# Patient Record
Sex: FEMALE | Race: WHITE | Employment: OTHER | URBAN - NONMETROPOLITAN AREA
[De-identification: names, ages, dates, MRNs, and addresses within clinical notes are randomized per-mention and may not be internally consistent; named-entity substitution may affect disease eponyms.]

---

## 2022-04-27 ENCOUNTER — TELEPHONE (OUTPATIENT)
Dept: NEUROSURGERY | Age: 62
End: 2022-04-27

## 2022-04-27 NOTE — TELEPHONE ENCOUNTER
Patient Information     Patient Name MRN Alicia Xie [id number] Female 1953      Telephone Encounter by Stefanie Singh at 2017  2:34 PM     Author:  Stefanie Singh Service:  (none) Author Type:  (none)     Filed:  2017  2:41 PM Encounter Date:  2017 Status:  Signed     :  Stefanie Singh            Patient called stating that she is having a burning sensation just below her thumb in her cast.  She stated that they tried to get some ointment down on it and it had helped.  She is questioning if the cast is rubbing on her.  She states that it feels fine if she doesn't move, but if she moves her arm it starts to burn again.  I let her know that she would probably need to come in and have it checked.  Stefanie Singh LPN .......2017 2:41 PM          Spoke with patient to ask her about getting her appointment scheduled from a referral that was received from [facility] and patient hung up, or she lost signal. I tried calling patient to get her scheduled and had to leave a voicemail. Left call back number [phone] .  [facility] stated to add patient on schedule for 4/28/22 , around 1:00 pm.

## 2022-04-27 NOTE — TELEPHONE ENCOUNTER
[facility] Neurosurgery New Patient Questionnaire    1. Diagnosis/Reason for Referral?    Lesion of lumbar spine    2. Who is completing questionnaire? Patient x Caregiver Family      3. Has the patient had any previous spinal/brain surgeries? A. If yes, what is the name of the facility in which the surgery was performed? B. Procedure/Surgery performed? C. Who was the surgeon? D. When was the surgery? MM/YY       E. Did the patient improve after the surgery? 4. Is this a second opinion? If yes, Dr. Rod Jimenez would like to review patient first before making the appointment. 5. Have MRI Images been obtain within the last year? Yes x No      XR  CT     If yes, where was the imaging performed? [facility]   If yes, what part of the body? Lumbar X Cervical  Thoracic  Brain     If yes, when was it obtained? 4-26-22    Note: if the scan was performed at a facility other than [facility], the disc will need to be brought to the appointment or we need to reach out to obtain the disc. A. Was the patient instructed to provide the disc? Yes  X No      8. Has the patient had a NCV/EMG within the last year? Yes  No X     If yes, where was it performed and date? MM/YY  Location:      9. Has the patient been to Physical Therapy? Yes  No X     If yes, what location, how long attended, and last visit? Location:        Therapy Lasted:    Date of Last Visit:      10. Has the patient been to Pain Management? Yes  No X   If yes, what location and last visit     Location:   Last Visit:   Is it helping?

## 2022-04-28 ENCOUNTER — OFFICE VISIT (OUTPATIENT)
Dept: NEUROSURGERY | Age: 62
End: 2022-04-28
Payer: MEDICARE

## 2022-04-28 ENCOUNTER — TELEPHONE (OUTPATIENT)
Dept: NEUROSURGERY | Age: 62
End: 2022-04-28

## 2022-04-28 VITALS
WEIGHT: 189 LBS | HEIGHT: 64 IN | HEART RATE: 80 BPM | DIASTOLIC BLOOD PRESSURE: 68 MMHG | SYSTOLIC BLOOD PRESSURE: 114 MMHG | BODY MASS INDEX: 32.27 KG/M2

## 2022-04-28 DIAGNOSIS — Z01.818 PRE-OP EXAMINATION: ICD-10-CM

## 2022-04-28 DIAGNOSIS — R79.1 ABNORMAL COAGULATION PROFILE: ICD-10-CM

## 2022-04-28 DIAGNOSIS — M54.41 ACUTE MIDLINE LOW BACK PAIN WITH RIGHT-SIDED SCIATICA: ICD-10-CM

## 2022-04-28 DIAGNOSIS — M48.061 LUMBAR FORAMINAL STENOSIS: ICD-10-CM

## 2022-04-28 DIAGNOSIS — M51.36 DDD (DEGENERATIVE DISC DISEASE), LUMBAR: ICD-10-CM

## 2022-04-28 DIAGNOSIS — M51.16 LUMBAR DISC HERNIATION WITH RADICULOPATHY: Primary | ICD-10-CM

## 2022-04-28 PROCEDURE — 99205 OFFICE O/P NEW HI 60 MIN: CPT | Performed by: NURSE PRACTITIONER

## 2022-04-28 RX ORDER — FUROSEMIDE 20 MG/1
TABLET ORAL
COMMUNITY
End: 2022-05-03

## 2022-04-28 RX ORDER — RIMEGEPANT SULFATE 75 MG/75MG
TABLET, ORALLY DISINTEGRATING ORAL
COMMUNITY
End: 2022-05-03

## 2022-04-28 RX ORDER — SODIUM CHLORIDE 0.9 % (FLUSH) 0.9 %
5-40 SYRINGE (ML) INJECTION EVERY 12 HOURS SCHEDULED
Status: CANCELLED | OUTPATIENT
Start: 2022-04-28

## 2022-04-28 RX ORDER — METOPROLOL SUCCINATE 100 MG/1
100 TABLET, EXTENDED RELEASE ORAL DAILY
COMMUNITY

## 2022-04-28 RX ORDER — ROPINIROLE 1 MG/1
1 TABLET, FILM COATED ORAL NIGHTLY
COMMUNITY
End: 2022-05-03 | Stop reason: ALTCHOICE

## 2022-04-28 RX ORDER — POTASSIUM CHLORIDE 750 MG/1
TABLET, FILM COATED, EXTENDED RELEASE ORAL
COMMUNITY
End: 2022-05-03

## 2022-04-28 RX ORDER — LEVOTHYROXINE SODIUM 0.2 MG/1
200 TABLET ORAL DAILY
COMMUNITY
End: 2022-05-03 | Stop reason: DRUGHIGH

## 2022-04-28 RX ORDER — CLONAZEPAM 0.5 MG/1
TABLET ORAL
COMMUNITY
End: 2022-05-03

## 2022-04-28 RX ORDER — HYDROCODONE BITARTRATE AND ACETAMINOPHEN 10; 325 MG/1; MG/1
1 TABLET ORAL EVERY 6 HOURS PRN
COMMUNITY

## 2022-04-28 RX ORDER — ACETAMINOPHEN 325 MG/1
1000 TABLET ORAL ONCE
Status: CANCELLED | OUTPATIENT
Start: 2022-04-28 | End: 2022-04-28

## 2022-04-28 RX ORDER — VENLAFAXINE HYDROCHLORIDE 225 MG/1
225 TABLET, EXTENDED RELEASE ORAL
COMMUNITY
End: 2022-05-03

## 2022-04-28 RX ORDER — HYDROCHLOROTHIAZIDE 25 MG/1
25 TABLET ORAL DAILY
COMMUNITY

## 2022-04-28 RX ORDER — FAMOTIDINE 20 MG/1
TABLET, FILM COATED ORAL
COMMUNITY
End: 2022-05-03 | Stop reason: ALTCHOICE

## 2022-04-28 RX ORDER — PREDNISONE 20 MG/1
TABLET ORAL
COMMUNITY
End: 2022-05-03 | Stop reason: ALTCHOICE

## 2022-04-28 RX ORDER — PANTOPRAZOLE SODIUM 40 MG/1
40 TABLET, DELAYED RELEASE ORAL DAILY
COMMUNITY

## 2022-04-28 RX ORDER — FLUTICASONE PROPIONATE 50 MCG
1 SPRAY, SUSPENSION (ML) NASAL DAILY PRN
COMMUNITY

## 2022-04-28 RX ORDER — TOPIRAMATE 25 MG/1
25 TABLET ORAL 2 TIMES DAILY
COMMUNITY

## 2022-04-28 RX ORDER — SODIUM CHLORIDE 9 MG/ML
INJECTION, SOLUTION INTRAVENOUS PRN
Status: CANCELLED | OUTPATIENT
Start: 2022-04-28

## 2022-04-28 RX ORDER — LANOLIN ALCOHOL/MO/W.PET/CERES
400 CREAM (GRAM) TOPICAL DAILY
COMMUNITY

## 2022-04-28 RX ORDER — PENTOXIFYLLINE 400 MG/1
400 TABLET, EXTENDED RELEASE ORAL
COMMUNITY

## 2022-04-28 RX ORDER — SODIUM CHLORIDE 0.9 % (FLUSH) 0.9 %
5-40 SYRINGE (ML) INJECTION PRN
Status: CANCELLED | OUTPATIENT
Start: 2022-04-28

## 2022-04-28 ASSESSMENT — ENCOUNTER SYMPTOMS
RESPIRATORY NEGATIVE: 1
EYES NEGATIVE: 1
BACK PAIN: 1
GASTROINTESTINAL NEGATIVE: 1

## 2022-04-28 NOTE — H&P
[facility] Neurosurgery  H&P      Chief Complaint   Patient presents with    Consultation     back pain. HISTORY OF PRESENT ILLNESS:    Vince Gaytan is a 63 y.o. female with DM who presents with low back and RLE pain that has been ongoing for about 1 month. The pain does radiate into the RIGHT groin and anterior thigh. Her pain is mostly located in the right groin and leg. The patient complains of numbness of the right anterior thigh. She complains of LEFT leg pain that radiates down the leg that is chronic from a previous lumbar surgery about 23 years ago. Her pain is worsened when going from a seated to standing position. Her pain is worsened with walking. Her pain is not changed when lying flat. Overall, indicative that the patient does not have a mechanical nature to their pain. The patient has underwent a non-operative treatment course that has included:  NSAIDs  Muscle Relaxers (Soma)  Lyrica  Gabapentin  Opiates (Norco - per [facility])  IM Steroids - could not walk after the shot       Of note she does not use tobacco and does not take blood thinning medications.                Past Medical History:   Diagnosis Date    Diabetes ([address]     Difficulty walking     Hypertension     Low back pain        Past Surgical History:   Procedure Laterality Date    KNEE SURGERY Right 2017    replacement       Current Outpatient Medications   Medication Sig Dispense Refill    clonazePAM (KLONOPIN) 0.5 MG tablet clonazepam 0.5 mg tablet      famotidine (PEPCID) 20 MG tablet famotidine 20 mg tablet      fluticasone (FLONASE) 50 MCG/ACT nasal spray fluticasone propionate 50 mcg/actuation nasal spray,suspension      furosemide (LASIX) 20 MG tablet furosemide 20 mg tablet      hydroCHLOROthiazide (HYDRODIURIL) 25 MG tablet hydrochlorothiazide 25 mg tablet      HYDROcodone-acetaminophen (NORCO)  MG per tablet hydrocodone 10 mg-acetaminophen 325 mg tablet   Take 1 tablet every 4 hours by oral route as needed for 30 days.  levothyroxine (SYNTHROID) 200 MCG tablet levothyroxine 200 mcg tablet      magnesium oxide (MAG-OX) 400 (240 Mg) MG tablet magnesium oxide 400 mg (241.3 mg magnesium) tablet      metoprolol succinate (TOPROL XL) 100 MG extended release tablet metoprolol succinate  mg tablet,extended release 24 hr      pentoxifylline (TRENTAL) 400 MG extended release tablet pentoxifylline  mg tablet,extended release      potassium chloride (KLOR-CON) 10 MEQ extended release tablet potassium chloride ER 10 mEq tablet,extended release      predniSONE (DELTASONE) 20 MG tablet prednisone 20 mg tablet      Rimegepant Sulfate (NURTEC) 75 MG TBDP Nurtec ODT 75 mg disintegrating tablet   Take 1 tablet as needed by oral route as directed for 15 days.  rOPINIRole (REQUIP) 1 MG tablet ropinirole 1 mg tablet      topiramate (TOPAMAX) 25 MG tablet topiramate 25 mg tablet      venlafaxine 225 MG extended release tablet venlafaxine  mg tablet,extended release 24 hr   Take 1 tablet every day by oral route.  pantoprazole (PROTONIX) 40 MG tablet pantoprazole 40 mg tablet,delayed release       No current facility-administered medications for this visit. Allergies:  Atenolol, Flexeril [cyclobenzaprine], Oxycodone-acetaminophen, and Perindopril    Social History:   Social History     Tobacco Use   Smoking Status Never Smoker   Smokeless Tobacco Never Used     Social History     Substance and Sexual Activity   Alcohol Use None         Family History:   Family History   Problem Relation Age of Onset    Cerebral Aneurysm Mother     Heart Disease Mother     Diabetes Father     Hypertension Father        REVIEW OF SYSTEMS:  Constitutional: Negative. HENT: Negative. Eyes: Negative. Respiratory: Negative. Cardiovascular: Negative. Gastrointestinal: Negative. Genitourinary: Negative. Musculoskeletal: Positive for back pain. Skin: Negative. Neurological: Negative. Endo/Heme/Allergies: Negative. Psychiatric/Behavioral: Negative. PHYSICAL EXAM:  Vitals:    04/28/22 1251   BP: 114/68   Pulse: 80     Constitutional: appears well-developed and well-nourished. Eyes  conjunctiva normal.  Pupils react to light  Ear, nose, throat - hearing intact to finger rub, No scars, masses, or lesions over external nose or ears, no atrophy oftongue  Neck- symmetric, no masses noted, no jugular vein distension  Respiration- chest wall appears symmetric, good expansion, normal effort without use of accessory muscles  Musculoskeletal  no significant wasting of muscles noted, no bony deformities, gait no gross ataxia  Extremities- no clubbing, cyanosis oredema  Skin  warm, dry, and intact. No rash, erythema, or pallor. Psychiatric  mood, affect, and behavior appear normal.     Neurologic Examination  Awake, Alert and oriented x 4  Normal speech pattern, following commands    Motor:  RIGHT:     iliopsoas 5/5    knee flexor 5/5    knee extension 5/5    EHL/dorsiflexion 5/5    plantar flexion 5/5    LEFT:      iliopsoas 5/5    knee flexor 5/5    knee extension 5/5    EHL/dorsiflexion 5/5    plantar flexion 5/5    No deficits to light touch or pinprick sensation  Reflexes are 2+ and symmetric  No myofacial tenderness to palpation  Normal Gait pattern        DATA and IMAGING:    Nursing/pcp notes, imaging, labs, and vitals reviewed. PT,OT and/or speech notes reviewed    No results found for: WBC, HGB, HCT, MCV, PLTNo results found for: NA, K, CL, CO2, BUN, CREATININE, GLUCOSE, CALCIUM, PROT, LABALBU, BILITOT, ALKPHOS, AST, ALT, LABGLOM, GFRAA, AGRATIO, GLOBNo results found for: INR, PROTIME      MRI Lumbar Spine (4/22/2022) Jef Mensah  I have personally reviewed these images and my interpretation is:   There is DDD thorughout  Evidence of previous surgery on the left at L5-S1  L4-5 mild foraminal stenosis bilaterally   L2-3 there is a right intra foraminal mass which could represent a disc herniation , there does appear to be some mass effect on the L2 nerve root       ASSESSMENT:    [place] January is a 63 y.o. female with complaints of RIGHT groin and thigh pain. ICD-10-CM    1. Lumbar disc herniation with radiculopathy  M51.16 APTT     CBC     Comprehensive Metabolic Panel     EKG 12 Lead     Protime-INR     Type and Screen     Urinalysis with Reflex to Culture   2. Lumbar foraminal stenosis  M48.061    3. DDD (degenerative disc disease), lumbar  M51.36    4. Acute midline low back pain with right-sided sciatica  M54.41    5. Pre-op examination  Z01.818 XR CHEST (2 VW)   6. Abnormal coagulation profile   R79.1 APTT     Protime-INR       PLAN:  We have discussed and reviewed the results of the MRI lumbar spine with Ms. Corley at length. We explained that she does have what is likely an extruded disc fragment at L2-3 on the RIGHT that is likely the culprit of her right groin and thigh pain. We discussed operative versus non-operative treatments, Ms. Saqib Esquivel states she is in so much pain and has not left her house in over a month and she is not wanting to try more non-operative treatments. We explained that the surgery will be done to treat the right groin and thigh pain not the LLE pain or back pain. She will need a RIGHT L2-3 microdiscectomy using a far lateral approach minimally invasive technique (May 11th 1st Case of the day)    May obtain permanent specimen depending on characteristics of material    We discussed risks, complications, and expectations, including but not limited to infection, paralysis, bowel and bladder dysfunction, possible need for revision procedure, persistent pain, spinal fluid leak, stroke and death. In addition, the benefits of the surgery were thoroughly discussed and the patient demonstrated a deep understanding. The patient wishes to proceed with surgical intervention.   We will schedule for surgery in the near future.       Leticia Mancia, STEW

## 2022-04-28 NOTE — H&P (VIEW-ONLY)
[facility] Neurosurgery  H&P      Chief Complaint   Patient presents with    Consultation     back pain. HISTORY OF PRESENT ILLNESS:    Estela Light is a 63 y.o. female with DM who presents with low back and RLE pain that has been ongoing for about 1 month. The pain does radiate into the RIGHT groin and anterior thigh. Her pain is mostly located in the right groin and leg. The patient complains of numbness of the right anterior thigh. She complains of LEFT leg pain that radiates down the leg that is chronic from a previous lumbar surgery about 23 years ago. Her pain is worsened when going from a seated to standing position. Her pain is worsened with walking. Her pain is not changed when lying flat. Overall, indicative that the patient does not have a mechanical nature to their pain. The patient has underwent a non-operative treatment course that has included:  NSAIDs  Muscle Relaxers (Soma)  Lyrica  Gabapentin  Opiates (Norco - per [facility])  IM Steroids - could not walk after the shot       Of note she does not use tobacco and does not take blood thinning medications.                Past Medical History:   Diagnosis Date    Diabetes ([address]     Difficulty walking     Hypertension     Low back pain        Past Surgical History:   Procedure Laterality Date    KNEE SURGERY Right 2017    replacement       Current Outpatient Medications   Medication Sig Dispense Refill    clonazePAM (KLONOPIN) 0.5 MG tablet clonazepam 0.5 mg tablet      famotidine (PEPCID) 20 MG tablet famotidine 20 mg tablet      fluticasone (FLONASE) 50 MCG/ACT nasal spray fluticasone propionate 50 mcg/actuation nasal spray,suspension      furosemide (LASIX) 20 MG tablet furosemide 20 mg tablet      hydroCHLOROthiazide (HYDRODIURIL) 25 MG tablet hydrochlorothiazide 25 mg tablet      HYDROcodone-acetaminophen (NORCO)  MG per tablet hydrocodone 10 mg-acetaminophen 325 mg tablet   Take 1 tablet every 4 hours by oral route as needed for 30 days.  levothyroxine (SYNTHROID) 200 MCG tablet levothyroxine 200 mcg tablet      magnesium oxide (MAG-OX) 400 (240 Mg) MG tablet magnesium oxide 400 mg (241.3 mg magnesium) tablet      metoprolol succinate (TOPROL XL) 100 MG extended release tablet metoprolol succinate  mg tablet,extended release 24 hr      pentoxifylline (TRENTAL) 400 MG extended release tablet pentoxifylline  mg tablet,extended release      potassium chloride (KLOR-CON) 10 MEQ extended release tablet potassium chloride ER 10 mEq tablet,extended release      predniSONE (DELTASONE) 20 MG tablet prednisone 20 mg tablet      Rimegepant Sulfate (NURTEC) 75 MG TBDP Nurtec ODT 75 mg disintegrating tablet   Take 1 tablet as needed by oral route as directed for 15 days.  rOPINIRole (REQUIP) 1 MG tablet ropinirole 1 mg tablet      topiramate (TOPAMAX) 25 MG tablet topiramate 25 mg tablet      venlafaxine 225 MG extended release tablet venlafaxine  mg tablet,extended release 24 hr   Take 1 tablet every day by oral route.  pantoprazole (PROTONIX) 40 MG tablet pantoprazole 40 mg tablet,delayed release       No current facility-administered medications for this visit. Allergies:  Atenolol, Flexeril [cyclobenzaprine], Oxycodone-acetaminophen, and Perindopril    Social History:   Social History     Tobacco Use   Smoking Status Never Smoker   Smokeless Tobacco Never Used     Social History     Substance and Sexual Activity   Alcohol Use None         Family History:   Family History   Problem Relation Age of Onset    Cerebral Aneurysm Mother     Heart Disease Mother     Diabetes Father     Hypertension Father        REVIEW OF SYSTEMS:  Constitutional: Negative. HENT: Negative. Eyes: Negative. Respiratory: Negative. Cardiovascular: Negative. Gastrointestinal: Negative. Genitourinary: Negative. Musculoskeletal: Positive for back pain. Skin: Negative. Neurological: Negative. Endo/Heme/Allergies: Negative. Psychiatric/Behavioral: Negative. PHYSICAL EXAM:  Vitals:    04/28/22 1251   BP: 114/68   Pulse: 80     Constitutional: appears well-developed and well-nourished. Eyes  conjunctiva normal.  Pupils react to light  Ear, nose, throat - hearing intact to finger rub, No scars, masses, or lesions over external nose or ears, no atrophy oftongue  Neck- symmetric, no masses noted, no jugular vein distension  Respiration- chest wall appears symmetric, good expansion, normal effort without use of accessory muscles  Musculoskeletal  no significant wasting of muscles noted, no bony deformities, gait no gross ataxia  Extremities- no clubbing, cyanosis oredema  Skin  warm, dry, and intact. No rash, erythema, or pallor. Psychiatric  mood, affect, and behavior appear normal.     Neurologic Examination  Awake, Alert and oriented x 4  Normal speech pattern, following commands    Motor:  RIGHT:     iliopsoas 5/5    knee flexor 5/5    knee extension 5/5    EHL/dorsiflexion 5/5    plantar flexion 5/5    LEFT:      iliopsoas 5/5    knee flexor 5/5    knee extension 5/5    EHL/dorsiflexion 5/5    plantar flexion 5/5    No deficits to light touch or pinprick sensation  Reflexes are 2+ and symmetric  No myofacial tenderness to palpation  Normal Gait pattern        DATA and IMAGING:    Nursing/pcp notes, imaging, labs, and vitals reviewed. PT,OT and/or speech notes reviewed    No results found for: WBC, HGB, HCT, MCV, PLTNo results found for: NA, K, CL, CO2, BUN, CREATININE, GLUCOSE, CALCIUM, PROT, LABALBU, BILITOT, ALKPHOS, AST, ALT, LABGLOM, GFRAA, AGRATIO, GLOBNo results found for: INR, PROTIME      MRI Lumbar Spine (4/22/2022) Marichuy Dent  I have personally reviewed these images and my interpretation is:   There is DDD thorughout  Evidence of previous surgery on the left at L5-S1  L4-5 mild foraminal stenosis bilaterally   L2-3 there is a right intra foraminal mass which could represent a disc herniation , there does appear to be some mass effect on the L2 nerve root       ASSESSMENT:    [place] January is a 63 y.o. female with complaints of RIGHT groin and thigh pain. ICD-10-CM    1. Lumbar disc herniation with radiculopathy  M51.16 APTT     CBC     Comprehensive Metabolic Panel     EKG 12 Lead     Protime-INR     Type and Screen     Urinalysis with Reflex to Culture   2. Lumbar foraminal stenosis  M48.061    3. DDD (degenerative disc disease), lumbar  M51.36    4. Acute midline low back pain with right-sided sciatica  M54.41    5. Pre-op examination  Z01.818 XR CHEST (2 VW)   6. Abnormal coagulation profile   R79.1 APTT     Protime-INR       PLAN:  We have discussed and reviewed the results of the MRI lumbar spine with Ms. Corley at length. We explained that she does have what is likely an extruded disc fragment at L2-3 on the RIGHT that is likely the culprit of her right groin and thigh pain. We discussed operative versus non-operative treatments, Ms. Saqib Esquivel states she is in so much pain and has not left her house in over a month and she is not wanting to try more non-operative treatments. We explained that the surgery will be done to treat the right groin and thigh pain not the LLE pain or back pain. She will need a RIGHT L2-3 microdiscectomy using a far lateral approach minimally invasive technique (May 11th 1st Case of the day)    May obtain permanent specimen depending on characteristics of material    We discussed risks, complications, and expectations, including but not limited to infection, paralysis, bowel and bladder dysfunction, possible need for revision procedure, persistent pain, spinal fluid leak, stroke and death. In addition, the benefits of the surgery were thoroughly discussed and the patient demonstrated a deep understanding. The patient wishes to proceed with surgical intervention.   We will schedule for surgery in the near future.       Vadim Ruff APRN

## 2022-04-28 NOTE — TELEPHONE ENCOUNTER
Upon checking patient in Pt stated that she doesn't have [facility] Medicare. Medicare came back as her medicare is managed by [employer]. And does have a copay today. Pt stated she cancelled [facility] because of the copay. I did tell pt that  today they would bill her for the amount. Pt stated she would call [facility] and take care of it, because it was suppose to be cancelled.

## 2022-04-28 NOTE — PROGRESS NOTES
[facility]  Office Visit      Chief Complaint   Patient presents with   [facility] Consultation     back pain. HISTORY OF PRESENT ILLNESS:    Estela Light is a 63 y.o. female with DM who presents with low back and RLE pain that has been ongoing for about 1 month. The pain does radiate into the RIGHT groin and anterior thigh. Her pain is mostly located in the right groin and leg. The patient complains of numbness of the right anterior thigh. She complains of LEFT leg pain that radiates down the leg that is chronic from a previous lumbar surgery about 23 years ago. Her pain is worsened when going from a seated to standing position. Her pain is worsened with walking. Her pain is not changed when lying flat. Overall, indicative that the patient does not have a mechanical nature to their pain. The patient has underwent a non-operative treatment course that has included:  NSAIDs  Muscle Relaxers (Soma)  Lyrica  Gabapentin  Opiates (Norco - per [facility] [place])  IM Steroids - could not walk after the shot       Of note she does not use tobacco and does not take blood thinning medications.                Past Medical History:   Diagnosis Date    Diabetes ([address]     Difficulty walking     Hypertension     Low back pain        Past Surgical History:   Procedure Laterality Date    KNEE SURGERY Right 2017    replacement       Current Outpatient Medications   Medication Sig Dispense Refill    clonazePAM (KLONOPIN) 0.5 MG tablet clonazepam 0.5 mg tablet      famotidine (PEPCID) 20 MG tablet famotidine 20 mg tablet      fluticasone (FLONASE) 50 MCG/ACT nasal spray fluticasone propionate 50 mcg/actuation nasal spray,suspension      furosemide (LASIX) 20 MG tablet furosemide 20 mg tablet      hydroCHLOROthiazide (HYDRODIURIL) 25 MG tablet hydrochlorothiazide 25 mg tablet      HYDROcodone-acetaminophen (NORCO)  MG per tablet hydrocodone 10 mg-acetaminophen 325 mg tablet   Take 1 tablet every 4 hours by oral route as needed for 30 days.  levothyroxine (SYNTHROID) 200 MCG tablet levothyroxine 200 mcg tablet      magnesium oxide (MAG-OX) 400 (240 Mg) MG tablet magnesium oxide 400 mg (241.3 mg magnesium) tablet      metoprolol succinate (TOPROL XL) 100 MG extended release tablet metoprolol succinate  mg tablet,extended release 24 hr      pentoxifylline (TRENTAL) 400 MG extended release tablet pentoxifylline  mg tablet,extended release      potassium chloride (KLOR-CON) 10 MEQ extended release tablet potassium chloride ER 10 mEq tablet,extended release      predniSONE (DELTASONE) 20 MG tablet prednisone 20 mg tablet      Rimegepant Sulfate (NURTEC) 75 MG TBDP Nurtec ODT 75 mg disintegrating tablet   Take 1 tablet as needed by oral route as directed for 15 days.  rOPINIRole (REQUIP) 1 MG tablet ropinirole 1 mg tablet      topiramate (TOPAMAX) 25 MG tablet topiramate 25 mg tablet      venlafaxine 225 MG extended release tablet venlafaxine  mg tablet,extended release 24 hr   Take 1 tablet every day by oral route.  pantoprazole (PROTONIX) 40 MG tablet pantoprazole 40 mg tablet,delayed release       No current facility-administered medications for this visit. Allergies:  Atenolol, Flexeril [cyclobenzaprine], Oxycodone-acetaminophen, and Perindopril    Social History:   Social History     Tobacco Use   Smoking Status Never Smoker   Smokeless Tobacco Never Used     Social History     Substance and Sexual Activity   Alcohol Use None         Family History:   Family History   Problem Relation Age of Onset    Cerebral Aneurysm Mother     Heart Disease Mother     Diabetes Father     Hypertension Father        REVIEW OF SYSTEMS:  Constitutional: Negative. HENT: Negative. Eyes: Negative. Respiratory: Negative. Cardiovascular: Negative. Gastrointestinal: Negative. Genitourinary: Negative. Musculoskeletal: Positive for back pain.    Skin: Negative. Neurological: Negative. Endo/Heme/Allergies: Negative. Psychiatric/Behavioral: Negative. PHYSICAL EXAM:  Vitals:    04/28/22 1251   BP: 114/68   Pulse: 80     Constitutional: appears well-developed and well-nourished. Eyes  conjunctiva normal.  Pupils react to light  Ear, nose, throat - hearing intact to finger rub, No scars, masses, or lesions over external nose or ears, no atrophy oftongue  Neck- symmetric, no masses noted, no jugular vein distension  Respiration- chest wall appears symmetric, good expansion, normal effort without use of accessory muscles  Musculoskeletal  no significant wasting of muscles noted, no bony deformities, gait no gross ataxia  Extremities- no clubbing, cyanosis oredema  Skin  warm, dry, and intact. No rash, erythema, or pallor. Psychiatric  mood, affect, and behavior appear normal.     Neurologic Examination  Awake, Alert and oriented x 4  Normal speech pattern, following commands    Motor:  RIGHT:     iliopsoas 5/5    knee flexor 5/5    knee extension 5/5    EHL/dorsiflexion 5/5    plantar flexion 5/5    LEFT:      iliopsoas 5/5    knee flexor 5/5    knee extension 5/5    EHL/dorsiflexion 5/5    plantar flexion 5/5    No deficits to light touch or pinprick sensation  Reflexes are 2+ and symmetric  No myofacial tenderness to palpation  Normal Gait pattern        DATA and IMAGING:    Nursing/pcp notes, imaging, labs, and vitals reviewed. PT,OT and/or speech notes reviewed    No results found for: WBC, HGB, HCT, MCV, PLTNo results found for: NA, K, CL, CO2, BUN, CREATININE, GLUCOSE, CALCIUM, PROT, LABALBU, BILITOT, ALKPHOS, AST, ALT, LABGLOM, GFRAA, AGRATIO, GLOBNo results found for: INR, PROTIME      MRI Lumbar Spine (4/22/2022) Karin Lugo  I have personally reviewed these images and my interpretation is:   There is DDD thorughout  Evidence of previous surgery on the left at L5-S1  L4-5 mild foraminal stenosis bilaterally   L2-3 there is a right intra foraminal mass which could represent a disc herniation , there does appear to be some mass effect on the L2 nerve root       ASSESSMENT:    Chantelle Reyes is a 63 y.o. female with complaints of right groin and anterior thigh pain. ICD-10-CM    1. Lumbar disc herniation with radiculopathy  M51.16 APTT     CBC     Comprehensive Metabolic Panel     EKG 12 Lead     Protime-INR     Type and Screen     Urinalysis with Reflex to Culture   2. Lumbar foraminal stenosis  M48.061    3. DDD (degenerative disc disease), lumbar  M51.36    4. Acute midline low back pain with right-sided sciatica  M54.41    5. Pre-op examination  Z01.818 XR CHEST (2 VW)   6. Abnormal coagulation profile   R79.1 APTT     Protime-INR       PLAN:  We have discussed and reviewed the results of the MRI lumbar spine with Ms. Corley at length. We explained that she does have what is likely an extruded disc fragment at L2-3 on the RIGHT that is likely the culprit of her right groin and thigh pain. We discussed operative versus non-operative treatments, Ms. Emma South states she is in so much pain and has not left her house in over a month and she is not wanting to try more non-operative treatments. We explained that the surgery will be done to treat the right groin and thigh pain not the LLE pain or back pain. She will need a RIGHT L2-3 microdiscectomy using a far lateral approach minimally invasive technique (May 11th 1st Case of the day)    May obtain permanent specimen depending on characteristics of material    We discussed risks, complications, and expectations, including but not limited to infection, paralysis, bowel and bladder dysfunction, possible need for revision procedure, persistent pain, spinal fluid leak, stroke and death. In addition, the benefits of the surgery were thoroughly discussed and the patient demonstrated a deep understanding. The patient wishes to proceed with surgical intervention.   We will schedule for surgery in the near future.       Jay Russo, APRN

## 2022-04-29 ENCOUNTER — TELEPHONE (OUTPATIENT)
Dept: NEUROSURGERY | Age: 62
End: 2022-04-29

## 2022-04-29 NOTE — TELEPHONE ENCOUNTER
Patient has an upcoming surgery and I was able to fill out a form and fax it in with clinicals. Cpt codes:  45475  icd-1o code:  M48.061  M54.41  Faxed and awaiting decision.

## 2022-05-02 NOTE — TELEPHONE ENCOUNTER
Received notification from [facility] on pre-cert status. It is APPROVED. Authorization#: [id number]    Approval is good from 4/29/2022-5/29/2022.

## 2022-05-03 ENCOUNTER — HOSPITAL ENCOUNTER (OUTPATIENT)
Dept: PREADMISSION TESTING | Age: 62
Discharge: HOME | End: 2022-05-07
Payer: MEDICARE

## 2022-05-03 ENCOUNTER — HOSPITAL ENCOUNTER (OUTPATIENT)
Dept: GENERAL RADIOLOGY | Age: 62
Discharge: HOME | End: 2022-05-03
Payer: MEDICARE

## 2022-05-03 VITALS — HEIGHT: 64 IN | BODY MASS INDEX: 32.27 KG/M2 | WEIGHT: 189 LBS

## 2022-05-03 DIAGNOSIS — Z01.818 PRE-OP EXAMINATION: ICD-10-CM

## 2022-05-03 DIAGNOSIS — M51.16 LUMBAR DISC HERNIATION WITH RADICULOPATHY: ICD-10-CM

## 2022-05-03 DIAGNOSIS — R79.1 ABNORMAL COAGULATION PROFILE: ICD-10-CM

## 2022-05-03 LAB
ABO/RH: NORMAL
ALBUMIN SERPL-MCNC: 4.3 G/DL (ref 3.5–5.2)
ALP BLD-CCNC: 84 U/L (ref 35–104)
ALT SERPL-CCNC: 22 U/L (ref 5–33)
ANION GAP SERPL CALCULATED.3IONS-SCNC: 14 MMOL/L (ref 7–19)
ANTIBODY SCREEN: NORMAL
APTT: 27 SEC (ref 26–36.2)
AST SERPL-CCNC: 24 U/L (ref 5–32)
BACTERIA: ABNORMAL /HPF
BILIRUB SERPL-MCNC: 0.3 MG/DL (ref 0.2–1.2)
BILIRUBIN URINE: NEGATIVE
BLOOD, URINE: NEGATIVE
BUN BLDV-MCNC: 25 MG/DL (ref 6–20)
CALCIUM SERPL-MCNC: 9.6 MG/DL (ref 8.6–10)
CHLORIDE BLD-SCNC: 100 MMOL/L (ref 98–111)
CLARITY: CLEAR
CO2: 24 MMOL/L (ref 22–29)
COLOR: YELLOW
CREAT SERPL-MCNC: 1.3 MG/DL (ref 0.5–0.9)
CRYSTALS, UA: ABNORMAL /HPF
EKG P AXIS: 33 DEGREES
EKG P-R INTERVAL: 170 MS
EKG Q-T INTERVAL: 408 MS
EKG QRS DURATION: 92 MS
EKG QTC CALCULATION (BAZETT): 411 MS
EKG T AXIS: 38 DEGREES
EPITHELIAL CELLS, UA: 13 /HPF (ref 0–5)
GFR AFRICAN AMERICAN: 51
GFR NON-AFRICAN AMERICAN: 42
GLUCOSE BLD-MCNC: 197 MG/DL (ref 74–109)
GLUCOSE URINE: NEGATIVE MG/DL
HCT VFR BLD CALC: 43.7 % (ref 37–47)
HEMOGLOBIN: 13.9 G/DL (ref 12–16)
HYALINE CASTS: 8 /HPF (ref 0–8)
INR BLD: 1.04 (ref 0.88–1.18)
KETONES, URINE: NEGATIVE MG/DL
LEUKOCYTE ESTERASE, URINE: ABNORMAL
MCH RBC QN AUTO: 30.4 PG (ref 27–31)
MCHC RBC AUTO-ENTMCNC: 31.8 G/DL (ref 33–37)
MCV RBC AUTO: 95.6 FL (ref 81–99)
NITRITE, URINE: NEGATIVE
PDW BLD-RTO: 12 % (ref 11.5–14.5)
PH UA: 5 (ref 5–8)
PLATELET # BLD: 324 K/UL (ref 130–400)
PMV BLD AUTO: 10 FL (ref 9.4–12.3)
POTASSIUM SERPL-SCNC: 4.2 MMOL/L (ref 3.5–5)
PROTEIN UA: NEGATIVE MG/DL
PROTHROMBIN TIME: 13.5 SEC (ref 12–14.6)
RBC # BLD: 4.57 M/UL (ref 4.2–5.4)
RBC UA: 1 /HPF (ref 0–4)
SODIUM BLD-SCNC: 138 MMOL/L (ref 136–145)
SPECIFIC GRAVITY UA: 1.02 (ref 1–1.03)
TOTAL PROTEIN: 6.9 G/DL (ref 6.6–8.7)
UROBILINOGEN, URINE: 0.2 E.U./DL
WBC # BLD: 8.4 K/UL (ref 4.8–10.8)
WBC UA: 11 /HPF (ref 0–5)

## 2022-05-03 PROCEDURE — 71046 X-RAY EXAM CHEST 2 VIEWS: CPT

## 2022-05-03 PROCEDURE — 93005 ELECTROCARDIOGRAM TRACING: CPT

## 2022-05-03 PROCEDURE — 80053 COMPREHEN METABOLIC PANEL: CPT

## 2022-05-03 PROCEDURE — 85027 COMPLETE CBC AUTOMATED: CPT

## 2022-05-03 PROCEDURE — 85610 PROTHROMBIN TIME: CPT

## 2022-05-03 PROCEDURE — 93010 ELECTROCARDIOGRAM REPORT: CPT | Performed by: INTERNAL MEDICINE

## 2022-05-03 PROCEDURE — 86850 RBC ANTIBODY SCREEN: CPT

## 2022-05-03 PROCEDURE — 86901 BLOOD TYPING SEROLOGIC RH(D): CPT

## 2022-05-03 PROCEDURE — 86900 BLOOD TYPING SEROLOGIC ABO: CPT

## 2022-05-03 PROCEDURE — 85730 THROMBOPLASTIN TIME PARTIAL: CPT

## 2022-05-03 PROCEDURE — 87086 URINE CULTURE/COLONY COUNT: CPT

## 2022-05-03 PROCEDURE — 81001 URINALYSIS AUTO W/SCOPE: CPT

## 2022-05-03 RX ORDER — UMECLIDINIUM BROMIDE AND VILANTEROL TRIFENATATE 62.5; 25 UG/1; UG/1
1 POWDER RESPIRATORY (INHALATION) DAILY
COMMUNITY

## 2022-05-03 RX ORDER — ALLOPURINOL 100 MG/1
100 TABLET ORAL 2 TIMES DAILY
COMMUNITY
Start: 2014-04-10

## 2022-05-03 RX ORDER — CARISOPRODOL 350 MG/1
350 TABLET ORAL 4 TIMES DAILY PRN
COMMUNITY

## 2022-05-03 RX ORDER — OXYBUTYNIN CHLORIDE 5 MG/1
5 TABLET ORAL DAILY
COMMUNITY

## 2022-05-03 RX ORDER — MONTELUKAST SODIUM 10 MG/1
10 TABLET ORAL NIGHTLY
COMMUNITY

## 2022-05-03 RX ORDER — LEVOTHYROXINE SODIUM 300 UG/1
300 TABLET ORAL DAILY
COMMUNITY

## 2022-05-03 RX ORDER — ACETAMINOPHEN 160 MG
3000 TABLET,DISINTEGRATING ORAL DAILY
COMMUNITY

## 2022-05-04 ENCOUNTER — TELEPHONE (OUTPATIENT)
Dept: NEUROSURGERY | Age: 62
End: 2022-05-04

## 2022-05-04 DIAGNOSIS — R94.31 ABNORMAL EKG: Primary | ICD-10-CM

## 2022-05-04 RX ORDER — CIPROFLOXACIN 250 MG/1
250 TABLET, FILM COATED ORAL 2 TIMES DAILY
Qty: 14 TABLET | Refills: 0 | Status: SHIPPED | OUTPATIENT
Start: 2022-05-04 | End: 2022-05-11

## 2022-05-04 NOTE — TELEPHONE ENCOUNTER
I called and s/w the patient, advised per STEW Garland, that UA does reflect a mild UTI. I advised that [facility] has already sent in a 7 day course of Cipro 250mg BID x7 days to [facility] and that patient should go ahead and get started on that today. I stated if there are no  major symptoms/complications related to this that patient should complete the course of medication and proceed with surgery as planned. Patient voiced understanding, states that she isn't having any current uti sx's but will get the med picked up today and begin taking that as prescribed. She will call back if she needs anything further.

## 2022-05-04 NOTE — TELEPHONE ENCOUNTER
----- Message from STEW Nunez sent at 5/4/2022 11:16 AM CDT -----  Please let Ms. Sidra Blanco know her Urine shows a mild UTI. I will go ahead and treat with just a 7 days course of antibiotics. She is fine to proceed with surgery given there are no implants. Med sent to [address] [place] [address].

## 2022-05-04 NOTE — TELEPHONE ENCOUNTER
Pre-op EKG reviewed and noted to be abnormal with findings of: inferior infarct, age undetermined. I have placed a cardiology consult to see if there are any contraindications to proceeding with surgery. I am sure she will be okay to proceed given that the surgery is a short minimally invasive procedure, however, we need to err on the safe side. Please make sure this gets done in a very timely fashion.   Thank  You!!

## 2022-05-05 LAB — URINE CULTURE, ROUTINE: NORMAL

## 2022-05-06 NOTE — TELEPHONE ENCOUNTER
I called cardiology and was able to speak to Alysha Red. ( from the answering service)  He sent message to cardiology. Then I got a call from [address] [place] [address]. She states she can get patient in on Tuesday 10 am.  [address] and she will be seeing Alfred Coleman.   Patient notified of all this and she states she will keep her appt

## 2022-05-10 ENCOUNTER — OFFICE VISIT (OUTPATIENT)
Dept: CARDIOLOGY CLINIC | Age: 62
End: 2022-05-10
Payer: MEDICARE

## 2022-05-10 ENCOUNTER — TELEPHONE (OUTPATIENT)
Dept: NEUROSURGERY | Age: 62
End: 2022-05-10

## 2022-05-10 VITALS
HEART RATE: 64 BPM | WEIGHT: 192 LBS | BODY MASS INDEX: 32.78 KG/M2 | DIASTOLIC BLOOD PRESSURE: 78 MMHG | SYSTOLIC BLOOD PRESSURE: 118 MMHG | HEIGHT: 64 IN

## 2022-05-10 DIAGNOSIS — I10 PRIMARY HYPERTENSION: ICD-10-CM

## 2022-05-10 DIAGNOSIS — Z01.810 PREOPERATIVE CARDIOVASCULAR EXAMINATION: Primary | ICD-10-CM

## 2022-05-10 DIAGNOSIS — I49.8 OTHER CARDIAC ARRHYTHMIA: ICD-10-CM

## 2022-05-10 PROCEDURE — 99203 OFFICE O/P NEW LOW 30 MIN: CPT | Performed by: CLINICAL NURSE SPECIALIST

## 2022-05-10 PROCEDURE — 93000 ELECTROCARDIOGRAM COMPLETE: CPT | Performed by: CLINICAL NURSE SPECIALIST

## 2022-05-10 NOTE — PROGRESS NOTES
[facility], [address], [address] [address]  Phone: [phone]  Fax: [phone]    OFFICE VISIT:  5/10/2022    Edwige Dillon - : 1960    Dear Dr. Anuel Coleman,     I appreciate the opportunity of participating in the care of Edwige Dillon. She is a very pleasant 63 y.o. female who I had the opportunity of seeing in my office today, 5/10/22. Records from your office have been obtained and reviewed. Reason For Visit:  Naveed Sabillon is a 63 y.o. female who is here for New Patient (no cardiac symptoms) and Abnormal Test Results (EKG)    Patient was referred to neurosurgery Dr. Anuel Coleman for lumbar disc herniation with radiculopathy. Surgery tomorrow. Preoperative work-up showed abnormal EKG    Ports that she has not had any cardiac symptoms. Activity is limited due to pain in her right leg coming from her back. She had stress test with dr Atilio Eaton in [facility] recently that was normal  She has had COVID and pneumonia twice, last time was a few mos ago. Ongoing SADLER and they were told she has residual effects from COVID. She is using an inhaler now. No wheezing      Subjective  Naveed Sabillon has no exertional chest pain, pressure, burning or squeezing. Denies shortness of breath rest.  Stable SADLER She is able to lie flat without evidence of orthopnea or paroxysmal nocturnal dyspnea. No symptomatic tachy- or samir-arrhythmia. No numbness or weakness to suggest cerebrovascular accident or transient ischemic attack. Reports no  edema. Edwige Dillon has the following history as recorded in [employer]: There are no problems to display for this patient.     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 ([address]     \"in remission right now\"    Cerebral artery occlusion with cerebral infarction ([address]     COVID-19 2022 was second round; has had covid x2; had pneumonia both times    Diabetes ([address]     Difficulty walking  Hypertension     Kidney disease     \"stage 3\"    Low back pain     Pneumonia     hx of with covid x 2     Past Surgical History:   Procedure Laterality Date    APPENDECTOMY      HYSTERECTOMY      KNEE SURGERY Right 2017    replacement     Family History   Problem Relation Age of Onset    Cerebral Aneurysm Mother     Heart Disease Mother     Diabetes Father     Hypertension Father      Social History     Tobacco Use    Smoking status: Never Smoker    Smokeless tobacco: Never Used   Substance Use Topics    Alcohol use: Not Currently        Allergies: Adhesive tape, Flexeril [cyclobenzaprine], Atenolol, Oxycodone-acetaminophen, and Perindopril    Current Outpatient Medications   Medication Sig Dispense Refill    ciprofloxacin (CIPRO) 250 MG tablet Take 1 tablet by mouth 2 times daily for 7 days 14 tablet 0    levothyroxine (SYNTHROID) 300 MCG tablet Take 300 mcg by mouth Daily Indications: Underactive Thyroid      carisoprodol (SOMA) 350 MG tablet Take 350 mg by mouth 4 times daily as needed for Muscle spasms.  montelukast (SINGULAIR) 10 MG tablet Take 10 mg by mouth nightly      oxybutynin (DITROPAN) 5 MG tablet Take 5 mg by mouth daily      SITagliptin (JANUVIA) 100 MG tablet Take 100 mg by mouth daily      allopurinol (ZYLOPRIM) 100 MG tablet Take 100 mg by mouth 2 times daily      umeclidinium-vilanterol (ANORO ELLIPTA) 62.5-25 MCG/INH AEPB inhaler Inhale 1 puff into the lungs daily      Cholecalciferol (VITAMIN D3) 50 MCG (2000 UT) CAPS Take 3,000 Units by mouth daily      fluticasone (FLONASE) 50 MCG/ACT nasal spray 1 spray by Nasal route daily as needed       hydroCHLOROthiazide (HYDRODIURIL) 25 MG tablet Take 25 mg by mouth daily       HYDROcodone-acetaminophen (NORCO)  MG per tablet Take 1 tablet by mouth every 6 hours as needed.        magnesium oxide (MAG-OX) 400 (240 Mg) MG tablet Take 400 mg by mouth daily       metoprolol succinate (TOPROL XL) 100 MG extended release tablet Take 100 mg by mouth daily       pentoxifylline (TRENTAL) 400 MG extended release tablet Take 400 mg by mouth 3 times daily (with meals)       topiramate (TOPAMAX) 25 MG tablet Take 25 mg by mouth 2 times daily       pantoprazole (PROTONIX) 40 MG tablet Take 40 mg by mouth daily        No current facility-administered medications for this visit. Review of Systems  Constitutional - no significant activity change, appetite change, or unexpected weight change. No fever, chills or diaphoresis. No fatigue. HEENT - no significant rhinorrhea or epistaxis. No tinnitus or significant hearing loss. Eyes - no sudden vision change or amaurosis. Respiratory - no significant wheezing, stridor, apnea or cough. No dyspnea on exertion or shortness of breath. Cardiovascular - no exertional chest pain, orthopnea or PND. No sensation of arrhythmia or slow heart rate. No claudication or leg edema. Gastrointestinal - no abdominal swelling or pain. No blood in stool. No severe constipation, diarrhea, nausea, or vomiting. Genitourinary - no difficulty urinating, dysuria, frequency, or urgency. No flank pain or hematuria. No  previous radiation or chemotherapy  Musculoskeletal - +  Back and right leg pain, +gait disturbance, or myalgia. Skin - no color change or rash. No pallor. No new surgical incision. Neurologic - no speech difficulty, facial asymmetry or lateralizing weakness. No seizures, presyncope, syncope, or significant dizziness. Hematologic - no easy bruising or excessive bleeding. Psychiatric - no severe anxiety or insomnia. No confusion. All other review of systems are negative. Objective  Vital Signs - /78   Pulse 64   Ht 5' 4\" (1.626 m)   Wt 192 lb (87.1 kg)   BMI 32.96 kg/m²   General - Mojgan Laboy is alert, cooperative, and pleasant. Well groomed. No acute distress. Body habitus is overweight. HEENT - The head is normocephalic. No circumoral cyanosis. Dentition is normal.   Ears and nose externally normal. No abnormal scars or lesions noted  EYES -  No Xanthelasma, no arcus senilis, no conjunctival hemorrhages or discharge. Neck - Supple, without increased jugular venous pressures. No carotid bruits. No mass. Respiratory - Lungs are clear bilaterally. No wheezes or rales. Normal effort without use of accessory muscles. No tactile fremitus on palpation  Cardiovascular - Heart has regular rhythm and rate. No murmurs, rubs or gallops. + pedal pulses and no varicosities. Abdominal -  Soft, nontender, nondistended. Bowel sounds are intact. Extremities - No clubbing, cyanosis, or  edema. Musculoskeletal -+ back pain with limp in gait no clubbing . No Osler's nodes. .  No kyphosis or scoliosis. Skin -  no statis ulcers or dermatitis. Neurological - No focal signs are identified. Oriented to person, place and time. Psychiatric -  Appropriate affect and mood. Assessment:          Diagnosis Orders   1. Preoperative cardiovascular examination     2. Other cardiac arrhythmia  EKG 12 lead   3. Primary hypertension         Preoperative EKG shows old inferior infarct. However doubtful actually Q waves    Called and received records from Dr. Coty Perera, cardiologist in [place]. Patient underwent a dobutamine nuclear stress test in March. Showed no ischemia or scar with preserved LV function EF 60%. 2D echo 3/14/2022 showed mildly dilated LV with intact LV contractility, diminished LV compliance, left atrium dilated, mild AI, mild TR and mild MR. Okay to proceed with lumbar surgery.   We will send letter of restratification to Dr. Jessa Rachel primary prevention measures including good blood pressure control, diabetes cholesterol control diet and exercise    Risk factors include diabetes, hypertension and cerebral artery occlusion        Plan    Will send letter to [address] [place], [place] [address]cam[place] Vaz[place]  Follow up as needed  Keep blood pressure,cholesterol and glucose well controlled. Call with any questions or concerns  Follow up with PCP for non cardiac problems  Report any new problems  Cardiovascular Fitness-Exercise as tolerated. Strive for 30 minutes of exercise most days of the week. Cardiac / Healthy Diet  Continue current medications as directed  Continue plan of treatment        I appreciate the opportunity of participating in the care and treatment of this patient. STEW Dupont dragon/transcription disclaimer: Much of this encounter note is electronic transcription/translation of spoken language to printed tach. Electronic translation of spoken language may be erroneous, or at times, nonsensical words or phrases may be inadvertently transcribed.  Although, I have reviewed the note for such errors, some may still exist.      Cc:  STEW Tuttle - CNP

## 2022-05-10 NOTE — PATIENT INSTRUCTIONS
Will send letter to DR De Los Santos Spearing  Follow up as needed  Keep blood pressure,cholesterol and glucose well controlled. Call with any questions or concerns  Follow up with PCP for non cardiac problems  Report any new problems  Cardiovascular Fitness-Exercise as tolerated. Strive for 30 minutes of exercise most days of the week.     Cardiac / Healthy Diet  Continue current medications as directed  Continue plan of treatment

## 2022-05-11 ENCOUNTER — ANESTHESIA (OUTPATIENT)
Dept: OPERATING ROOM | Age: 62
End: 2022-05-11
Payer: MEDICARE

## 2022-05-11 ENCOUNTER — APPOINTMENT (OUTPATIENT)
Dept: GENERAL RADIOLOGY | Age: 62
End: 2022-05-11
Attending: NEUROLOGICAL SURGERY
Payer: MEDICARE

## 2022-05-11 ENCOUNTER — ANESTHESIA EVENT (OUTPATIENT)
Dept: OPERATING ROOM | Age: 62
End: 2022-05-11
Payer: MEDICARE

## 2022-05-11 ENCOUNTER — HOSPITAL ENCOUNTER (OUTPATIENT)
Age: 62
Setting detail: OUTPATIENT SURGERY
Discharge: HOME | End: 2022-05-11
Attending: NEUROLOGICAL SURGERY | Admitting: NEUROLOGICAL SURGERY
Payer: MEDICARE

## 2022-05-11 VITALS
WEIGHT: 192 LBS | OXYGEN SATURATION: 97 % | DIASTOLIC BLOOD PRESSURE: 56 MMHG | TEMPERATURE: 96.7 F | RESPIRATION RATE: 16 BRPM | HEIGHT: 64 IN | BODY MASS INDEX: 32.78 KG/M2 | HEART RATE: 62 BPM | SYSTOLIC BLOOD PRESSURE: 113 MMHG

## 2022-05-11 VITALS — SYSTOLIC BLOOD PRESSURE: 88 MMHG | DIASTOLIC BLOOD PRESSURE: 52 MMHG | TEMPERATURE: 95.4 F | OXYGEN SATURATION: 96 %

## 2022-05-11 PROCEDURE — 2720000010 HC SURG SUPPLY STERILE: Performed by: NEUROLOGICAL SURGERY

## 2022-05-11 PROCEDURE — 2709999900 HC NON-CHARGEABLE SUPPLY: Performed by: NEUROLOGICAL SURGERY

## 2022-05-11 PROCEDURE — 6360000002 HC RX W HCPCS: Performed by: ANESTHESIOLOGY

## 2022-05-11 PROCEDURE — 7100000001 HC PACU RECOVERY - ADDTL 15 MIN: Performed by: NEUROLOGICAL SURGERY

## 2022-05-11 PROCEDURE — 6370000000 HC RX 637 (ALT 250 FOR IP): Performed by: NURSE PRACTITIONER

## 2022-05-11 PROCEDURE — 2580000003 HC RX 258: Performed by: ANESTHESIOLOGY

## 2022-05-11 PROCEDURE — 3600000005 HC SURGERY LEVEL 5 BASE: Performed by: NEUROLOGICAL SURGERY

## 2022-05-11 PROCEDURE — 7100000000 HC PACU RECOVERY - FIRST 15 MIN: Performed by: NEUROLOGICAL SURGERY

## 2022-05-11 PROCEDURE — 63056 DECOMPRESS SPINAL CORD LMBR: CPT | Performed by: NEUROLOGICAL SURGERY

## 2022-05-11 PROCEDURE — 3600000015 HC SURGERY LEVEL 5 ADDTL 15MIN: Performed by: NEUROLOGICAL SURGERY

## 2022-05-11 PROCEDURE — 2500000003 HC RX 250 WO HCPCS: Performed by: NURSE ANESTHETIST, CERTIFIED REGISTERED

## 2022-05-11 PROCEDURE — 6360000002 HC RX W HCPCS: Performed by: NURSE ANESTHETIST, CERTIFIED REGISTERED

## 2022-05-11 PROCEDURE — 3700000001 HC ADD 15 MINUTES (ANESTHESIA): Performed by: NEUROLOGICAL SURGERY

## 2022-05-11 PROCEDURE — C1769 GUIDE WIRE: HCPCS | Performed by: NEUROLOGICAL SURGERY

## 2022-05-11 PROCEDURE — 7100000011 HC PHASE II RECOVERY - ADDTL 15 MIN: Performed by: NEUROLOGICAL SURGERY

## 2022-05-11 PROCEDURE — 3700000000 HC ANESTHESIA ATTENDED CARE: Performed by: NEUROLOGICAL SURGERY

## 2022-05-11 PROCEDURE — 6370000000 HC RX 637 (ALT 250 FOR IP): Performed by: ANESTHESIOLOGY

## 2022-05-11 PROCEDURE — 6360000002 HC RX W HCPCS: Performed by: NURSE PRACTITIONER

## 2022-05-11 PROCEDURE — 7100000010 HC PHASE II RECOVERY - FIRST 15 MIN: Performed by: NEUROLOGICAL SURGERY

## 2022-05-11 RX ORDER — DEXAMETHASONE SODIUM PHOSPHATE 10 MG/ML
8 INJECTION, SOLUTION INTRAMUSCULAR; INTRAVENOUS ONCE
Status: DISCONTINUED | OUTPATIENT
Start: 2022-05-11 | End: 2022-05-11 | Stop reason: HOSPADM

## 2022-05-11 RX ORDER — MEPERIDINE HYDROCHLORIDE 25 MG/ML
12.5 INJECTION INTRAMUSCULAR; INTRAVENOUS; SUBCUTANEOUS EVERY 5 MIN PRN
Status: DISCONTINUED | OUTPATIENT
Start: 2022-05-11 | End: 2022-05-11 | Stop reason: HOSPADM

## 2022-05-11 RX ORDER — SODIUM CHLORIDE 0.9 % (FLUSH) 0.9 %
5-40 SYRINGE (ML) INJECTION PRN
Status: DISCONTINUED | OUTPATIENT
Start: 2022-05-11 | End: 2022-05-11 | Stop reason: HOSPADM

## 2022-05-11 RX ORDER — FENTANYL CITRATE 50 UG/ML
INJECTION, SOLUTION INTRAMUSCULAR; INTRAVENOUS PRN
Status: DISCONTINUED | OUTPATIENT
Start: 2022-05-11 | End: 2022-05-11 | Stop reason: SDUPTHER

## 2022-05-11 RX ORDER — MORPHINE SULFATE 2 MG/ML
2 INJECTION, SOLUTION INTRAMUSCULAR; INTRAVENOUS EVERY 5 MIN PRN
Status: DISCONTINUED | OUTPATIENT
Start: 2022-05-11 | End: 2022-05-11 | Stop reason: HOSPADM

## 2022-05-11 RX ORDER — MORPHINE SULFATE 4 MG/ML
4 INJECTION, SOLUTION INTRAMUSCULAR; INTRAVENOUS EVERY 5 MIN PRN
Status: DISCONTINUED | OUTPATIENT
Start: 2022-05-11 | End: 2022-05-11 | Stop reason: HOSPADM

## 2022-05-11 RX ORDER — DEXAMETHASONE SODIUM PHOSPHATE 10 MG/ML
INJECTION, SOLUTION INTRAMUSCULAR; INTRAVENOUS PRN
Status: DISCONTINUED | OUTPATIENT
Start: 2022-05-11 | End: 2022-05-11 | Stop reason: SDUPTHER

## 2022-05-11 RX ORDER — ONDANSETRON 2 MG/ML
INJECTION INTRAMUSCULAR; INTRAVENOUS PRN
Status: DISCONTINUED | OUTPATIENT
Start: 2022-05-11 | End: 2022-05-11 | Stop reason: SDUPTHER

## 2022-05-11 RX ORDER — MIDAZOLAM HYDROCHLORIDE 1 MG/ML
INJECTION INTRAMUSCULAR; INTRAVENOUS PRN
Status: DISCONTINUED | OUTPATIENT
Start: 2022-05-11 | End: 2022-05-11 | Stop reason: SDUPTHER

## 2022-05-11 RX ORDER — PROPOFOL 10 MG/ML
INJECTION, EMULSION INTRAVENOUS PRN
Status: DISCONTINUED | OUTPATIENT
Start: 2022-05-11 | End: 2022-05-11 | Stop reason: SDUPTHER

## 2022-05-11 RX ORDER — METOCLOPRAMIDE HYDROCHLORIDE 5 MG/ML
10 INJECTION INTRAMUSCULAR; INTRAVENOUS
Status: DISCONTINUED | OUTPATIENT
Start: 2022-05-11 | End: 2022-05-11 | Stop reason: HOSPADM

## 2022-05-11 RX ORDER — DIPHENHYDRAMINE HYDROCHLORIDE 50 MG/ML
12.5 INJECTION INTRAMUSCULAR; INTRAVENOUS
Status: DISCONTINUED | OUTPATIENT
Start: 2022-05-11 | End: 2022-05-11 | Stop reason: HOSPADM

## 2022-05-11 RX ORDER — ACETAMINOPHEN 500 MG
1000 TABLET ORAL ONCE
Status: COMPLETED | OUTPATIENT
Start: 2022-05-11 | End: 2022-05-11

## 2022-05-11 RX ORDER — LIDOCAINE HYDROCHLORIDE 10 MG/ML
1 INJECTION, SOLUTION EPIDURAL; INFILTRATION; INTRACAUDAL; PERINEURAL
Status: DISCONTINUED | OUTPATIENT
Start: 2022-05-11 | End: 2022-05-11 | Stop reason: HOSPADM

## 2022-05-11 RX ORDER — MIDAZOLAM HYDROCHLORIDE 1 MG/ML
2 INJECTION INTRAMUSCULAR; INTRAVENOUS
Status: DISCONTINUED | OUTPATIENT
Start: 2022-05-11 | End: 2022-05-11 | Stop reason: HOSPADM

## 2022-05-11 RX ORDER — LIDOCAINE HYDROCHLORIDE 10 MG/ML
INJECTION, SOLUTION INFILTRATION; PERINEURAL PRN
Status: DISCONTINUED | OUTPATIENT
Start: 2022-05-11 | End: 2022-05-11 | Stop reason: SDUPTHER

## 2022-05-11 RX ORDER — SODIUM CHLORIDE 0.9 % (FLUSH) 0.9 %
5-40 SYRINGE (ML) INJECTION EVERY 12 HOURS SCHEDULED
Status: DISCONTINUED | OUTPATIENT
Start: 2022-05-11 | End: 2022-05-11 | Stop reason: HOSPADM

## 2022-05-11 RX ORDER — FAMOTIDINE 20 MG/1
20 TABLET, FILM COATED ORAL ONCE
Status: COMPLETED | OUTPATIENT
Start: 2022-05-11 | End: 2022-05-11

## 2022-05-11 RX ORDER — SCOLOPAMINE TRANSDERMAL SYSTEM 1 MG/1
1 PATCH, EXTENDED RELEASE TRANSDERMAL
Status: DISCONTINUED | OUTPATIENT
Start: 2022-05-11 | End: 2022-05-11 | Stop reason: HOSPADM

## 2022-05-11 RX ORDER — ROCURONIUM BROMIDE 10 MG/ML
INJECTION, SOLUTION INTRAVENOUS PRN
Status: DISCONTINUED | OUTPATIENT
Start: 2022-05-11 | End: 2022-05-11 | Stop reason: SDUPTHER

## 2022-05-11 RX ORDER — SODIUM CHLORIDE, SODIUM LACTATE, POTASSIUM CHLORIDE, CALCIUM CHLORIDE 600; 310; 30; 20 MG/100ML; MG/100ML; MG/100ML; MG/100ML
INJECTION, SOLUTION INTRAVENOUS CONTINUOUS
Status: DISCONTINUED | OUTPATIENT
Start: 2022-05-11 | End: 2022-05-11 | Stop reason: HOSPADM

## 2022-05-11 RX ORDER — DEXMEDETOMIDINE HYDROCHLORIDE 100 UG/ML
INJECTION, SOLUTION INTRAVENOUS PRN
Status: DISCONTINUED | OUTPATIENT
Start: 2022-05-11 | End: 2022-05-11 | Stop reason: SDUPTHER

## 2022-05-11 RX ORDER — SODIUM CHLORIDE 9 MG/ML
INJECTION, SOLUTION INTRAVENOUS PRN
Status: DISCONTINUED | OUTPATIENT
Start: 2022-05-11 | End: 2022-05-11 | Stop reason: HOSPADM

## 2022-05-11 RX ADMIN — FENTANYL CITRATE 50 MCG: 50 INJECTION, SOLUTION INTRAMUSCULAR; INTRAVENOUS at 07:31

## 2022-05-11 RX ADMIN — Medication 2000 MG: at 07:41

## 2022-05-11 RX ADMIN — MORPHINE SULFATE 4 MG: 4 INJECTION, SOLUTION INTRAMUSCULAR; INTRAVENOUS at 09:38

## 2022-05-11 RX ADMIN — ACETAMINOPHEN 1000 MG: 500 TABLET ORAL at 07:01

## 2022-05-11 RX ADMIN — DEXAMETHASONE SODIUM PHOSPHATE 10 MG: 10 INJECTION, SOLUTION INTRAMUSCULAR; INTRAVENOUS at 07:39

## 2022-05-11 RX ADMIN — ONDANSETRON 4 MG: 2 INJECTION INTRAMUSCULAR; INTRAVENOUS at 07:39

## 2022-05-11 RX ADMIN — PROPOFOL 150 MG: 10 INJECTION, EMULSION INTRAVENOUS at 07:31

## 2022-05-11 RX ADMIN — MIDAZOLAM 2 MG: 1 INJECTION INTRAMUSCULAR; INTRAVENOUS at 07:26

## 2022-05-11 RX ADMIN — MORPHINE SULFATE 4 MG: 4 INJECTION, SOLUTION INTRAMUSCULAR; INTRAVENOUS at 09:58

## 2022-05-11 RX ADMIN — FENTANYL CITRATE 50 MCG: 50 INJECTION, SOLUTION INTRAMUSCULAR; INTRAVENOUS at 08:22

## 2022-05-11 RX ADMIN — LIDOCAINE HYDROCHLORIDE 50 MG: 10 INJECTION, SOLUTION INFILTRATION; PERINEURAL at 07:31

## 2022-05-11 RX ADMIN — DEXMEDETOMIDINE HYDROCHLORIDE 10 MCG: 100 INJECTION, SOLUTION, CONCENTRATE INTRAVENOUS at 08:55

## 2022-05-11 RX ADMIN — SODIUM CHLORIDE, POTASSIUM CHLORIDE, SODIUM LACTATE AND CALCIUM CHLORIDE: 600; 310; 30; 20 INJECTION, SOLUTION INTRAVENOUS at 07:04

## 2022-05-11 RX ADMIN — FENTANYL CITRATE 50 MCG: 50 INJECTION, SOLUTION INTRAMUSCULAR; INTRAVENOUS at 08:41

## 2022-05-11 RX ADMIN — FENTANYL CITRATE 50 MCG: 50 INJECTION, SOLUTION INTRAMUSCULAR; INTRAVENOUS at 08:17

## 2022-05-11 RX ADMIN — FAMOTIDINE 20 MG: 20 TABLET ORAL at 07:02

## 2022-05-11 RX ADMIN — SUGAMMADEX 200 MG: 100 INJECTION, SOLUTION INTRAVENOUS at 09:07

## 2022-05-11 RX ADMIN — ROCURONIUM BROMIDE 15 MG: 10 INJECTION, SOLUTION INTRAVENOUS at 08:20

## 2022-05-11 RX ADMIN — ROCURONIUM BROMIDE 50 MG: 10 INJECTION, SOLUTION INTRAVENOUS at 07:31

## 2022-05-11 ASSESSMENT — PAIN SCALES - GENERAL
PAINLEVEL_OUTOF10: 8
PAINLEVEL_OUTOF10: 6
PAINLEVEL_OUTOF10: 10
PAINLEVEL_OUTOF10: 0
PAINLEVEL_OUTOF10: 4

## 2022-05-11 ASSESSMENT — ENCOUNTER SYMPTOMS: SHORTNESS OF BREATH: 0

## 2022-05-11 ASSESSMENT — PAIN DESCRIPTION - ONSET
ONSET: ON-GOING
ONSET: ON-GOING

## 2022-05-11 ASSESSMENT — PAIN DESCRIPTION - PAIN TYPE
TYPE: SURGICAL PAIN

## 2022-05-11 ASSESSMENT — PAIN - FUNCTIONAL ASSESSMENT: PAIN_FUNCTIONAL_ASSESSMENT: ACTIVITIES ARE NOT PREVENTED

## 2022-05-11 ASSESSMENT — PAIN DESCRIPTION - DESCRIPTORS
DESCRIPTORS: ACHING
DESCRIPTORS: SORE

## 2022-05-11 ASSESSMENT — PAIN DESCRIPTION - ORIENTATION
ORIENTATION: LOWER
ORIENTATION: LOWER
ORIENTATION: MID
ORIENTATION: LOWER

## 2022-05-11 ASSESSMENT — PAIN DESCRIPTION - LOCATION
LOCATION: BACK

## 2022-05-11 ASSESSMENT — LIFESTYLE VARIABLES: SMOKING_STATUS: 0

## 2022-05-11 ASSESSMENT — PAIN DESCRIPTION - FREQUENCY
FREQUENCY: CONTINUOUS
FREQUENCY: CONTINUOUS

## 2022-05-11 NOTE — BRIEF OP NOTE
Brief Postoperative Note      Patient: Ez Louis  YOB: 1960  MRN: [id number]    Date of Procedure: 5/11/2022    Pre-Op Diagnosis: Lumbar disc herniation with radiculopathy    Post-Op Diagnosis: Same       Procedure(s):  RIGHT L2-3 microdiscectomy using a far lateral approach minimally invasive technique    Surgeon(s):  Cliff Wilson DO    Assistant:  * No surgical staff found *    Anesthesia: General    Estimated Blood Loss (mL): Minimal    Complications: None    Specimens:   * No specimens in log *    Implants:  * No implants in log *      Drains: * No LDAs found *    Findings: Large intraforaminal disc extrusion removed in piecemeal fashion. Herniation was clearly pressing on the right L2 nerve root. The nerve root was decompressed nicely. No issues.     Electronically signed by Cliff Wilson DO on 5/11/2022 at 9:08 AM

## 2022-05-11 NOTE — ANESTHESIA POSTPROCEDURE EVALUATION
Department of Anesthesiology  Postprocedure Note    Patient: Arsen Johnson  MRN: [id number]  YOB: 1960  Date of evaluation: 5/11/2022  Time:  9:19 AM     Procedure Summary     Date: 05/11/22 Room / Location: [address]    Anesthesia Start: 1833 Anesthesia Stop: 6967    Procedure: RIGHT L2-3 microdiscectomy using a far lateral approach minimally invasive technique (Right ) Diagnosis: (Lumbar disc herniation with radiculopathy)    Surgeons: Toro Turner DO Responsible Provider: STEW Shore CRNA    Anesthesia Type: general ASA Status: 2          Anesthesia Type: No value filed. Yaneli Phase I: Yaneli Score: 10    Yaneli Phase II:      Last vitals: Reviewed and per EMR flowsheets.        Anesthesia Post Evaluation    Patient location during evaluation: PACU  Patient participation: complete - patient participated  Level of consciousness: sleepy but conscious  Pain score: 0  Airway patency: patent  Nausea & Vomiting: no nausea and no vomiting  Complications: no  Cardiovascular status: hemodynamically stable  Respiratory status: acceptable, spontaneous ventilation and nasal cannula (2L)  Hydration status: euvolemic

## 2022-05-11 NOTE — ANESTHESIA PRE PROCEDURE
Department of Anesthesiology  Preprocedure Note       Name:  Jenny Levin   Age:  63 y.o.  :  1960                                          MRN:  [id number]         Date:  2022      Surgeon: Rita Sung):  Rere Ge DO    Procedure: Procedure(s):  RIGHT L2-3 microdiscectomy using a far lateral approach minimally invasive technique    Medications prior to admission:   Prior to Admission medications    Medication Sig Start Date End Date Taking? Authorizing Provider   ciprofloxacin (CIPRO) 250 MG tablet Take 1 tablet by mouth 2 times daily for 7 days 22  Leticia Conception, APRN   levothyroxine (SYNTHROID) 300 MCG tablet Take 300 mcg by mouth Daily Indications: Underactive Thyroid    Historical Provider, MD   carisoprodol (SOMA) 350 MG tablet Take 350 mg by mouth 4 times daily as needed for Muscle spasms. Historical Provider, MD   montelukast (SINGULAIR) 10 MG tablet Take 10 mg by mouth nightly    Historical Provider, MD   oxybutynin (DITROPAN) 5 MG tablet Take 5 mg by mouth daily    Historical Provider, MD   SITagliptin (JANUVIA) 100 MG tablet Take 100 mg by mouth daily    Historical Provider, MD   allopurinol (ZYLOPRIM) 100 MG tablet Take 100 mg by mouth 2 times daily 4/10/14   Historical Provider, MD   umeclidinium-vilanterol (ANORO ELLIPTA) 62.5-25 MCG/INH AEPB inhaler Inhale 1 puff into the lungs daily    Historical Provider, MD   Cholecalciferol (VITAMIN D3) 50 MCG (2000 UT) CAPS Take 3,000 Units by mouth daily    Historical Provider, MD   fluticasone (FLONASE) 50 MCG/ACT nasal spray 1 spray by Nasal route daily as needed     Historical Provider, MD   hydroCHLOROthiazide (HYDRODIURIL) 25 MG tablet Take 25 mg by mouth daily     Historical Provider, MD   HYDROcodone-acetaminophen (NORCO)  MG per tablet Take 1 tablet by mouth every 6 hours as needed.      Historical Provider, MD   magnesium oxide (MAG-OX) 400 (240 Mg) MG tablet Take 400 mg by mouth daily     Historical Provider, MD metoprolol succinate (TOPROL XL) 100 MG extended release tablet Take 100 mg by mouth daily     Historical Provider, MD   pentoxifylline (TRENTAL) 400 MG extended release tablet Take 400 mg by mouth 3 times daily (with meals)     Historical Provider, MD   topiramate (TOPAMAX) 25 MG tablet Take 25 mg by mouth 2 times daily     Historical Provider, MD   pantoprazole (PROTONIX) 40 MG tablet Take 40 mg by mouth daily     Historical Provider, MD       Current medications:    Current Facility-Administered Medications   Medication Dose Route Frequency Provider Last Rate Last Admin    0.9 % sodium chloride infusion   IntraVENous PRN STEW Bowen        acetaminophen (TYLENOL) tablet 1,000 mg  1,000 mg Oral Once STEW Escobedo        ceFAZolin (ANCEF) 2000 mg in 0.9% sodium chloride 50 mL IVPB  2,000 mg IntraVENous On Call to [address] [place] [address], STEW        dexamethasone (PF) (DECADRON) injection 8 mg  8 mg IntraVENous Once Jaja Garcia, APRN        sodium chloride flush 0.9 % injection 5-40 mL  5-40 mL IntraVENous 2 times per day STEW Bowen        sodium chloride flush 0.9 % injection 5-40 mL  5-40 mL IntraVENous PRN Kiah Batres, APRN        lactated ringers infusion   IntraVENous Continuous Jeanine Silver MD           Allergies: Allergies   Allergen Reactions    Adhesive Tape Rash     Silk tape caused irritating rash    Flexeril [Cyclobenzaprine] Other (See Comments)     cramps    Atenolol      Pt not aware of this allergy    Oxycodone-Acetaminophen     Perindopril        Problem List:  There is no problem list on file for this patient.       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 ([facility] [address]     \"in remission right now\"    Cerebral artery occlusion with cerebral infarction ([facility] [address]     COVID-19 01/2022 1/2022 was second round; has had covid x2; had pneumonia both times    Diabetes ([address]     Difficulty walking     Hypertension     Kidney disease \"stage 3\"    Low back pain     Pneumonia     hx of with covid x 2       Past Surgical History:        Procedure Laterality Date    APPENDECTOMY      HYSTERECTOMY      KNEE SURGERY Right 2017    replacement       Social History:    Social History     Tobacco Use    Smoking status: Never Smoker    Smokeless tobacco: Never Used   Substance Use Topics    Alcohol use: Not Currently                                Counseling given: Not Answered      Vital Signs (Current):   Vitals:    05/11/22 0638   BP: 131/74   Pulse: 64   Resp: 16   Temp: 97.9 °F (36.6 °C)   TempSrc: Temporal   SpO2: 96%   Weight: 192 lb (87.1 kg)   Height: 5' 4\" (1.626 m)                                              BP Readings from Last 3 Encounters:   05/11/22 131/74   05/10/22 118/78   04/28/22 114/68       NPO Status: Time of last liquid consumption: 2100                        Time of last solid consumption: 2100                        Date of last liquid consumption: 05/10/22                        Date of last solid food consumption: 05/10/22    BMI:   Wt Readings from Last 3 Encounters:   05/11/22 192 lb (87.1 kg)   05/10/22 192 lb (87.1 kg)   05/03/22 189 lb (85.7 kg)     Body mass index is 32.96 kg/m². CBC:   Lab Results   Component Value Date    WBC 8.4 05/03/2022    RBC 4.57 05/03/2022    HGB 13.9 05/03/2022    HCT 43.7 05/03/2022    MCV 95.6 05/03/2022    RDW 12.0 05/03/2022     05/03/2022       CMP:   Lab Results   Component Value Date     05/03/2022    K 4.2 05/03/2022     05/03/2022    CO2 24 05/03/2022    BUN 25 05/03/2022    CREATININE 1.3 05/03/2022    GFRAA 51 05/03/2022    LABGLOM 42 05/03/2022    GLUCOSE 197 05/03/2022    PROT 6.9 05/03/2022    CALCIUM 9.6 05/03/2022    BILITOT 0.3 05/03/2022    ALKPHOS 84 05/03/2022    AST 24 05/03/2022    ALT 22 05/03/2022       POC Tests: No results for input(s): POCGLU, POCNA, POCK, POCCL, POCBUN, POCHEMO, POCHCT in the last 72 hours.     Coags:   Lab Results Component Value Date    PROTIME 13.5 05/03/2022    INR 1.04 05/03/2022    APTT 27.0 05/03/2022       HCG (If Applicable): No results found for: PREGTESTUR, PREGSERUM, HCG, HCGQUANT     ABGs: No results found for: PHART, PO2ART, KQU2PZI, CMR7WWH, BEART, U9RXFOZP     Type & Screen (If Applicable):  No results found for: LABABO, LABRH    Drug/Infectious Status (If Applicable):  No results found for: HIV, HEPCAB    COVID-19 Screening (If Applicable): No results found for: COVID19        Anesthesia Evaluation  Patient summary reviewed and Nursing notes reviewed no history of anesthetic complications:   Airway: Mallampati: III  TM distance: >3 FB   Neck ROM: full  Mouth opening: > = 3 FB Dental: normal exam         Pulmonary:Negative Pulmonary ROS and normal exam  breath sounds clear to auscultation      (-) shortness of breath and not a current smoker          Patient did not smoke on day of surgery. Cardiovascular:    (+) hypertension:,     (-) CAD,  angina and  CHF    NYHA Classification: I  ECG reviewed  Rhythm: regular  Rate: normal           Beta Blocker:  Not on Beta Blocker         Neuro/Psych:   Negative Neuro/Psych ROS  (+) CVA: no interval change, neuromuscular disease:,    (-) seizures and depression/anxiety            GI/Hepatic/Renal: Neg GI/Hepatic/Renal ROS  (+) morbid obesity     (-) hiatal hernia and GERD       Endo/Other: Negative Endo/Other ROS   (+) Diabetes, . Pt had PAT visit. Abdominal:   (+) obese,     Abdomen: soft. Vascular: Other Findings:             Anesthesia Plan      general     ASA 2     (Iv zofran within 30 min of closing )  Induction: intravenous. BIS  MIPS: Postoperative opioids intended and Prophylactic antiemetics administered. Anesthetic plan and risks discussed with patient. Use of blood products discussed with patient whom. Plan discussed with CRNA.     Attending anesthesiologist reviewed and agrees with Pre Eval content              Kiki Severe, MD   5/11/2022

## 2022-05-11 NOTE — OP NOTE
Operative Note  NEUROSURGICAL DEPARTMENT REPORT     NAME OF SURGEON/: HAIR BEST DO     DATE OF SERVICE: 5/11/2022     PREOPERATIVE DIAGNOSES    1. Right L2- L3 far lateral herniated nucleus pulposus with right L2 radiculopathy, recalcitrant to nonoperatively treatments. POSTOPERATIVE DIAGNOSES    Same     OPERATIVE PROCEDURES  Right L2-3 lateral facetectomy and right  L2-L3 far lateral microdiscectomy for decompression of the right L2 nerve root utilizing the operating microscope,the tubular retractor and microdissection technique. SURGEON  Yosef Trejo. BayDO     FIRST [place], [place]    Estimated blood loss:  Minimal    HISTORY  Patient is a 46-year-old female with a 2 to 3-month history of severe low back and right lower extremity pain. The pain would radiate into the right groin anterior thigh. Patient said the pain was mostly in the right groin. There was some numbness along the same distribution. The patient was noted to not have any major neurologic deficits upon examination. MRI of the lumbar spine revealed a intraforaminal disc extrusion at L2-3 on the right resulting in compression of the right L2 nerve root. .  There was transitional anatomy. After a trial of nonoperative treatments without any significant success the patient like to move forward with surgical intervention. DESCRIPTION OF PROCEDURE    The patient was brought to the operating room where general endotracheal anesthesia was introduced was positioned on the operating table in the prone position with the Tariq frame in place. All pressure points were carefully checked and padded. The C-arm fluoroscope was positioned and draped. Operating microscope was positioned and draped. The lumbosacral region was clipped and prepared for 10 minutes with Betadine scrub, Betadine paint and DuraPrep. The patient was draped in the usual sterile fashion.      After the patient's skin was prepped and draped, the level of the L2-3 disc space was localized fluoroscopically utilizing a 22-gauge needle. A 1 inch incision was made 4.0 cm off midline at this level on the right side. Incision was carried out to subcutaneous tissue. The underlying fascia was penetrated with a K wire. Sequential dilators were inserted over the K wire followed by placement of a 6 cm length 18 mm diameter tubular retractor. A tubular retractor was connected to the articulating arm. The dilators were removed the operating microscope was bought brought into the field. Under microscopic view, the trasnverse process of L2 was localized and a lateral facetectomy was carried out. Bone removal was performed utilizing various Kerrison rongeurs as well as the high-speed drill. The transverse ligament was opened and was resected with a fine-angled Kerrison rongeur. This allowed exposure of the L2 nerve root. Exploration beneath the nerve root revealed as anticipated that it would a disc herniation. The disc herniation was large. It was removed in piecemeal fashion. The nerve root was decompressed. Inspection with the ball tip probe revealed the nerve root to be completely decompressed and there were no further fragments. Wound was then copiously irrigated with antibiotic solution and meticulous hemostasis was assured 40 mL of Depo-Medrol was applied. The tubular retractor was removed. The wound was closed in layered fashion. A sterile dressing was applied. The patient was returned to the stretcher in the supine position and was extubated. The patient then returned recovery room in a stable condition.

## 2022-05-18 ENCOUNTER — OFFICE VISIT (OUTPATIENT)
Dept: NEUROSURGERY | Age: 62
End: 2022-05-18

## 2022-05-18 VITALS
WEIGHT: 192 LBS | HEART RATE: 81 BPM | HEIGHT: 64 IN | BODY MASS INDEX: 32.78 KG/M2 | RESPIRATION RATE: 18 BRPM | DIASTOLIC BLOOD PRESSURE: 60 MMHG | SYSTOLIC BLOOD PRESSURE: 118 MMHG

## 2022-05-18 DIAGNOSIS — Z48.89 ENCOUNTER FOR POST SURGICAL WOUND CHECK: Primary | ICD-10-CM

## 2022-05-18 DIAGNOSIS — Z98.890 S/P LUMBAR MICRODISCECTOMY: ICD-10-CM

## 2022-05-18 PROCEDURE — 99024 POSTOP FOLLOW-UP VISIT: CPT | Performed by: NURSE PRACTITIONER

## 2022-05-18 ASSESSMENT — ENCOUNTER SYMPTOMS
GASTROINTESTINAL NEGATIVE: 1
RESPIRATORY NEGATIVE: 1
EYES NEGATIVE: 1

## 2022-05-18 NOTE — PROGRESS NOTES
18 [facility] Office Visit          Chief Complaint   Patient presents with    Wound Check     R L2-3 microdiscectomy    Back Pain     Patient states she is doing good and isn't having any pain. She also states she no longer has pain going down her right leg and groin area. HISTORY OF PRESENT ILLNESS:      Arsen Johnson is a 63 y.o. female who underwent a RIGHT L2-3 microdiscectomy using a far lateral approach minimally invasive technique for Lumbar disc herniation with radiculopathy on 5/11/2022 and now she is 1 week out from her surgery. Prior to surgery she complained of lpow back and RLE pain that radiated into the groin and anterior thigh with numbness. LEFT leg pain is chronic from a previous surgery 23 years ago. Physical exam was normal.      Today she states that states she is doing very well and has no complaints. States the top of her right thigh is numb but burning and stinging of the groin has subsided. She is very pleased with her outcome. Denies any fever, chills, nausea, or vomiting.          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 ([address]     \"in remission right now\"    Cerebral artery occlusion with cerebral infarction ([address]     COVID-19 01/2022 1/2022 was second round; has had covid x2; had pneumonia both times    Diabetes ([address]     Difficulty walking     Hypertension     Kidney disease     \"stage 3\"    Low back pain     Pneumonia     hx of with covid x 2       Past Surgical History:   Procedure Laterality Date    APPENDECTOMY      HYSTERECTOMY      KNEE SURGERY Right 2017    replacement    LUMBAR SPINE SURGERY Right 5/11/2022    RIGHT L2-3 microdiscectomy using a far lateral approach minimally invasive technique performed by Toro Turner DO at [address] [facility] OR        Medications    Current Outpatient Medications:     levothyroxine (SYNTHROID) 300 MCG tablet, Take 300 mcg by mouth Daily Indications: Underactive Thyroid, Disp: , Rfl:     carisoprodol (SOMA) 350 MG tablet, Take 350 mg by mouth 4 times daily as needed for Muscle spasms. , Disp: , Rfl:     montelukast (SINGULAIR) 10 MG tablet, Take 10 mg by mouth nightly, Disp: , Rfl:     oxybutynin (DITROPAN) 5 MG tablet, Take 5 mg by mouth daily, Disp: , Rfl:     SITagliptin (JANUVIA) 100 MG tablet, Take 100 mg by mouth daily, Disp: , Rfl:     allopurinol (ZYLOPRIM) 100 MG tablet, Take 100 mg by mouth 2 times daily, Disp: , Rfl:     umeclidinium-vilanterol (ANORO ELLIPTA) 62.5-25 MCG/INH AEPB inhaler, Inhale 1 puff into the lungs daily, Disp: , Rfl:     Cholecalciferol (VITAMIN D3) 50 MCG (2000 UT) CAPS, Take 3,000 Units by mouth daily, Disp: , Rfl:     fluticasone (FLONASE) 50 MCG/ACT nasal spray, 1 spray by Nasal route daily as needed , Disp: , Rfl:     hydroCHLOROthiazide (HYDRODIURIL) 25 MG tablet, Take 25 mg by mouth daily , Disp: , Rfl:     HYDROcodone-acetaminophen (NORCO)  MG per tablet, Take 1 tablet by mouth every 6 hours as needed.  , Disp: , Rfl:     magnesium oxide (MAG-OX) 400 (240 Mg) MG tablet, Take 400 mg by mouth daily , Disp: , Rfl:     metoprolol succinate (TOPROL XL) 100 MG extended release tablet, Take 100 mg by mouth daily , Disp: , Rfl:     pentoxifylline (TRENTAL) 400 MG extended release tablet, Take 400 mg by mouth 3 times daily (with meals) , Disp: , Rfl:     topiramate (TOPAMAX) 25 MG tablet, Take 25 mg by mouth 2 times daily , Disp: , Rfl:     pantoprazole (PROTONIX) 40 MG tablet, Take 40 mg by mouth daily , Disp: , Rfl:   Adhesive tape, Flexeril [cyclobenzaprine], Atenolol, Oxycodone-acetaminophen, and Perindopril    Social History  Social History     Tobacco Use   Smoking Status Never Smoker   Smokeless Tobacco Never Used     Social History     Substance and Sexual Activity   Alcohol Use Not Currently         Family History   Problem Relation Age of Onset    Cerebral Aneurysm Mother     Heart Disease Mother     Diabetes Father     Hypertension Father        Review of Systems:  Review of Systems   Constitutional: Negative. HENT: Negative. Eyes: Negative. Respiratory: Negative. Cardiovascular: Negative. Gastrointestinal: Negative. Genitourinary: Negative. Musculoskeletal: Negative. Skin: Negative. Neurological: Negative. Endo/Heme/Allergies: Negative. Psychiatric/Behavioral: Negative. PHYSICAL EXAM:  Vitals:    05/18/22 1037   BP: 118/60   Pulse: 81   Resp: 18     Constitutional: The patient appears well-developed andwell-nourished. Eyes - conjunctiva normal.  Conjugate gaze  Ear, nose, throat -No scars, masses, or lesions over external nose or ears, no atrophy of tongue  Neck-symmetric, no masses noted, no jugular vein distension  Respiration- chest wall appears symmetric, goodexpansion, normal effort without use of accessory muscles  Musculoskeletal - no significant wasting of muscles noted, no bony deformities, gait no gross ataxia  Extremities-no clubbing, cyanosis or edema  Skin- warm, dry, and intact. No rash, erythema, or pallor.   Psychiatric - mood, affect, and behavior appear normal.     Neurologic Examination  Awake, Alert and oriented x 4  Normal speech pattern, following commands    Motor:  RIGHT:     iliopsoas 5/5    knee flexor 5/5    knee extension 5/5    EHL/dorsiflexion 5/5    plantar flexion 5/5    LEFT:       iliopsoas 5/5    knee flexor 5/5    knee extension 5/5    EHL/dorsiflexion 5/5    plantar flexion 5/5    No deficits to light touch or pinprick sensation  Reflexes are 2+ and symmetric  No myofacial tenderness to palpation  Normal Gait pattern      Wound:  C/D/I, steri strips still intact, no S/S of infection     DATA and IMAGING:    Lab Results   Component Value Date    WBC 8.4 05/03/2022    HGB 13.9 05/03/2022    HCT 43.7 05/03/2022    MCV 95.6 05/03/2022     05/03/2022     Lab Results   Component Value Date     05/03/2022    K 4.2 05/03/2022

## 2022-06-20 ENCOUNTER — TELEPHONE (OUTPATIENT)
Dept: NEUROSURGERY | Age: 62
End: 2022-06-20

## 2022-11-09 ENCOUNTER — TELEPHONE (OUTPATIENT)
Dept: CARDIOLOGY CLINIC | Age: 62
End: 2022-11-09

## 2022-11-09 NOTE — TELEPHONE ENCOUNTER
Date: 11-18-22    Cardiologist: Only seen Hernan Sharp     Procedure: Left ankle muscle transfer     Surgeon: Casper James Office Visit: 5-10-22    Reason for office visit and medical concerns addressed at this office visit: abnormal EKG, HTN, DM    Testing Performed and Date of Service:  EKG 5-10-22    RCRI = 0 pts, low, 0.4%   METs 4    Current Medications: synthroid, soma, singulair, ditropan, januvia, allopurinol, flonase, hydroduyril, norco, mag-ox, toprol xl, trental, topamax protonix     Is the patient currently taking an anticoagulant? If so, what is the diagnosis the patient has been given to warrant the need for the anticoagulant? NA    Additional Notes: Cardiac Risk Request for sx lasting 150 min.

## (undated) DEVICE — NEEDLE SPNL 22GA L3.5IN BLK HUB S STL REG WALL FIT STYL W/

## (undated) DEVICE — GLOVE SURG SZ 75 L12IN FNGR THK94MIL TRNSLUC YEL LTX

## (undated) DEVICE — 3M™ STERI-STRIP™ REINFORCED ADHESIVE SKIN CLOSURES, R1547, 1/2 IN X 4 IN (12 MM X 100 MM), 6 STRIPS/ENVELOPE: Brand: 3M™ STERI-STRIP™

## (undated) DEVICE — E-Z CLEAN, NON-STICK, PTFE COATED, ELECTROSURGICAL BLADE ELECTRODE, BAYONET, MODIFIED EXTENDED INSULATION, 6.5 INCH (16.5 CM): Brand: MEGADYNE

## (undated) DEVICE — INSTRUMENT 8670005 350 MM GUIDEWRE SHARP

## (undated) DEVICE — C-ARMOR C-ARM EQUIPMENT COVERS CLEAR STERILE UNIVERSAL FIT 12 PER CASE: Brand: C-ARMOR

## (undated) DEVICE — MASTISOL ADHESIVE LIQ 2/3ML

## (undated) DEVICE — GOWN,PREVENTION PLUS,L,ST,24/CS: Brand: MEDLINE

## (undated) DEVICE — SPK10281 JACKSON TABLE KIT: Brand: SPK10281 JACKSON TABLE KIT

## (undated) DEVICE — SUTURE VCRL SZ 3-0 L18IN ABSRB UD L26MM SH 1/2 CIR J864D

## (undated) DEVICE — UNDERGLOVE SURG SZ 8 FNGR THK0.21MIL GRN LTX BEAD CUF

## (undated) DEVICE — TOWEL,OR,DSP,ST,BLUE,DLX,4/PK,20PK/CS: Brand: MEDLINE

## (undated) DEVICE — MICRO KOVER: Brand: UNBRANDED

## (undated) DEVICE — TOOL T12MH25L LGD 12CM 2.5MM MATCH LG: Brand: MIDAS REX®

## (undated) DEVICE — NEURO CDS

## (undated) DEVICE — E-Z CLEAN, NON-STICK, PTFE COATED, ELECTROSURGICAL BLADE ELECTRODE, MODIFIED EXTENDED INSULATION, 2.5 INCH (6.35 CM): Brand: MEGADYNE

## (undated) DEVICE — BAG BND W36XL36IN TRNSPAR POLY GEN PURP W E BND CLSR TIDI

## (undated) DEVICE — DRESSING TRNSPAR W5XL4.5IN FLM SHT SEMIPERMEABLE WIND

## (undated) DEVICE — SUTURE VCRL SZ 2-0 L27IN ABSRB VLT L26MM UR-6 5/8 CIR J602H

## (undated) DEVICE — TIBURON LAPAROTOMY DRAPE: Brand: CONVERTORS